# Patient Record
Sex: MALE | Race: WHITE
[De-identification: names, ages, dates, MRNs, and addresses within clinical notes are randomized per-mention and may not be internally consistent; named-entity substitution may affect disease eponyms.]

---

## 2020-11-02 ENCOUNTER — HOSPITAL ENCOUNTER (OUTPATIENT)
Dept: HOSPITAL 61 - SURGPAT | Age: 70
End: 2020-11-02
Attending: ORTHOPAEDIC SURGERY
Payer: COMMERCIAL

## 2020-11-02 DIAGNOSIS — I49.3: ICD-10-CM

## 2020-11-02 DIAGNOSIS — M17.31: ICD-10-CM

## 2020-11-02 DIAGNOSIS — Z01.818: Primary | ICD-10-CM

## 2020-11-02 DIAGNOSIS — Z96.651: ICD-10-CM

## 2020-11-02 DIAGNOSIS — I10: ICD-10-CM

## 2020-11-02 DIAGNOSIS — R94.31: ICD-10-CM

## 2020-11-02 LAB
ALBUMIN SERPL-MCNC: 3.6 G/DL (ref 3.4–5)
ANION GAP SERPL CALC-SCNC: 8 MMOL/L (ref 6–14)
APTT BLD: 35 SEC (ref 24–38)
BASOPHILS # BLD AUTO: 0.1 X10^3/UL (ref 0–0.2)
BASOPHILS NFR BLD: 3 % (ref 0–3)
BUN SERPL-MCNC: 18 MG/DL (ref 8–26)
CALCIUM SERPL-MCNC: 9.1 MG/DL (ref 8.5–10.1)
CHLORIDE SERPL-SCNC: 100 MMOL/L (ref 98–107)
CO2 SERPL-SCNC: 28 MMOL/L (ref 21–32)
CREAT SERPL-MCNC: 1.2 MG/DL (ref 0.7–1.3)
CRP SERPL-MCNC: 4.9 MG/L (ref 0–3.3)
EOSINOPHIL NFR BLD: 0.1 X10^3/UL (ref 0–0.7)
EOSINOPHIL NFR BLD: 2 % (ref 0–3)
ERYTHROCYTE [DISTWIDTH] IN BLOOD BY AUTOMATED COUNT: 13.5 % (ref 11.5–14.5)
GFR SERPLBLD BASED ON 1.73 SQ M-ARVRAT: 60.2 ML/MIN
GLUCOSE SERPL-MCNC: 98 MG/DL (ref 70–99)
HCT VFR BLD CALC: 43.4 % (ref 39–53)
HGB BLD-MCNC: 14.8 G/DL (ref 13–17.5)
LYMPHOCYTES # BLD: 1.1 X10^3/UL (ref 1–4.8)
LYMPHOCYTES NFR BLD AUTO: 25 % (ref 24–48)
MCH RBC QN AUTO: 30 PG (ref 25–35)
MCHC RBC AUTO-ENTMCNC: 34 G/DL (ref 31–37)
MCV RBC AUTO: 88 FL (ref 79–100)
MONO #: 0.4 X10^3/UL (ref 0–1.1)
MONOCYTES NFR BLD: 10 % (ref 0–9)
NEUT #: 2.7 X10^3/UL (ref 1.8–7.7)
NEUTROPHILS NFR BLD AUTO: 61 % (ref 31–73)
PLATELET # BLD AUTO: 293 X10^3/UL (ref 140–400)
POTASSIUM SERPL-SCNC: 4.5 MMOL/L (ref 3.5–5.1)
PROTHROMBIN TIME: 13 SEC (ref 11.7–14)
RBC # BLD AUTO: 4.93 X10^6/UL (ref 4.3–5.7)
SODIUM SERPL-SCNC: 136 MMOL/L (ref 136–145)
WBC # BLD AUTO: 4.4 X10^3/UL (ref 4–11)

## 2020-11-02 PROCEDURE — 87641 MR-STAPH DNA AMP PROBE: CPT

## 2020-11-02 PROCEDURE — 85610 PROTHROMBIN TIME: CPT

## 2020-11-02 PROCEDURE — 85730 THROMBOPLASTIN TIME PARTIAL: CPT

## 2020-11-02 PROCEDURE — 82040 ASSAY OF SERUM ALBUMIN: CPT

## 2020-11-02 PROCEDURE — 86140 C-REACTIVE PROTEIN: CPT

## 2020-11-02 PROCEDURE — 36415 COLL VENOUS BLD VENIPUNCTURE: CPT

## 2020-11-02 PROCEDURE — 82306 VITAMIN D 25 HYDROXY: CPT

## 2020-11-02 PROCEDURE — 93005 ELECTROCARDIOGRAM TRACING: CPT

## 2020-11-02 PROCEDURE — 83036 HEMOGLOBIN GLYCOSYLATED A1C: CPT

## 2020-11-02 PROCEDURE — 85025 COMPLETE CBC W/AUTO DIFF WBC: CPT

## 2020-11-02 PROCEDURE — 80048 BASIC METABOLIC PNL TOTAL CA: CPT

## 2020-11-02 PROCEDURE — 71046 X-RAY EXAM CHEST 2 VIEWS: CPT

## 2020-11-02 NOTE — RAD
EXAM: Chest, 2 views.

 

HISTORY: Hypertension.

 

COMPARISON: None.

 

FINDINGS: 2 views of the chest are obtained. There is no infiltrate, 

effusion or pneumothorax. The heart is normal in size. There is suspected 

left basilar atelectasis or scarring. There are healed rib fractures. The 

heart is normal in size.

 

IMPRESSION: No acute pulmonary finding.

 

Electronically signed by: Shonna Jerome MD (11/2/2020 1:32 PM) [id number]

## 2020-11-02 NOTE — EKG
[facility]

              [address] [place] [address]

Test Date:    2020               Test Time:    12:17:28

Pat Name:     LEROY SCHOENFELDER       Department:   

Patient ID:   [id number]           Room:          

Gender:       M                        Technician:   

:          1950               Requested By: NORMAN MILTON

Order Number: [id number]           Reading MD:   Rogelio Hoyt

                                 Measurements

Intervals                              Axis          

Rate:         73                       P:            90

KS:           178                      QRS:          46

QRSD:         98                       T:            63

QT:           388                                    

QTc:          431                                    

                           Interpretive Statements

SINUS RHYTHM

VENTRICULAR PREMATURE COMPLEX(ES)

ABNORMAL ECG

RI6.02

No previous ECG available for comparison

Electronically Signed On 11-3-2020 10:28:23 CST by Rogelio Hoyt

## 2020-11-03 LAB — HBA1C MFR BLD: 5.6 % (ref 4.8–5.6)

## 2020-11-13 ENCOUNTER — HOSPITAL ENCOUNTER (OUTPATIENT)
Dept: HOSPITAL 61 - LAB | Age: 70
End: 2020-11-13
Attending: ORTHOPAEDIC SURGERY
Payer: COMMERCIAL

## 2020-11-13 DIAGNOSIS — Z20.828: ICD-10-CM

## 2020-11-13 DIAGNOSIS — Z01.812: Primary | ICD-10-CM

## 2020-11-13 PROCEDURE — U0003 INFECTIOUS AGENT DETECTION BY NUCLEIC ACID (DNA OR RNA); SEVERE ACUTE RESPIRATORY SYNDROME CORONAVIRUS 2 (SARS-COV-2) (CORONAVIRUS DISEASE [COVID-19]), AMPLIFIED PROBE TECHNIQUE, MAKING USE OF HIGH THROUGHPUT TECHNOLOGIES AS DESCRIBED BY CMS-2020-01-R: HCPCS

## 2021-07-21 ENCOUNTER — HOSPITAL ENCOUNTER (OUTPATIENT)
Dept: HOSPITAL 63 - CT | Age: 71
End: 2021-07-21
Attending: FAMILY MEDICINE
Payer: MEDICARE

## 2021-07-21 DIAGNOSIS — I70.0: ICD-10-CM

## 2021-07-21 DIAGNOSIS — I25.10: ICD-10-CM

## 2021-07-21 DIAGNOSIS — Z12.2: Primary | ICD-10-CM

## 2021-07-21 DIAGNOSIS — Z87.891: ICD-10-CM

## 2021-07-21 DIAGNOSIS — J43.9: ICD-10-CM

## 2021-07-21 DIAGNOSIS — K80.20: ICD-10-CM

## 2021-07-21 PROCEDURE — 71271 CT THORAX LUNG CANCER SCR C-: CPT

## 2021-07-21 NOTE — RAD
EXAM: CT CHEST WITHOUT CONTRAST (LDCT LUNG CANCER SCREENING).



HISTORY: Risk factors for pulmonary malignancy. History of smoking.



TECHNIQUE: CT of the chest was performed without intravenous contrast using a low-dose lung screening
 protocol. Findings analysis is based on ACR Lung-RADS v1.1. *One or more of the following individual
ized dose reduction techniques were utilized for this examination:  

1. Automated exposure control.  

2. Adjustment of the mA and/or kV according to patient size.  

3. Use of iterative reconstruction technique.



RADIATION DOSE:

DLP: 68

CTDI VOL(per sequence): 1.55



COMPARISON: None.. 





FINDINGS:



Nodules: There is a 6 mm irregular nodular opacity in the right upper lobe (image 137, series 3). No 
other pulmonary nodules.



Other findings: The heart is normal in size. No pericardial effusion. There are coronary artery calci
fications. Thoracic aorta is normal in caliber. Mild calcified aortic atherosclerosis. There is a sma
ll low right paratracheal lymph node measuring 7 mm short axis. No suspicious lymphadenopathy in the 
chest. There is mild to moderate centrilobular and paraseptal emphysema. No pleural effusion. Central
 airways are clear. There is cholelithiasis. Calcified atherosclerosis is seen in the abdominal aorta
. 



IMPRESSION/RECOMMENDATION:



1. 6 mm irregular subpleural nodular opacity in the right upper lobe, possibly scarring or atelectasi
s. Recommend 6 month follow-up low-dose CT of the chest.



2. Mild to moderate emphysema.



3. Coronary artery calcifications.



4. Cholelithiasis.





ACR Lung-RADS category: 3-probably benign. 



Recommend follow-up six-month low-dose CT of the chest.





Electronically signed by: Becca Cox MD (7/21/2021 5:08 PM) [id number]